# Patient Record
Sex: FEMALE | Race: BLACK OR AFRICAN AMERICAN | NOT HISPANIC OR LATINO | Employment: UNEMPLOYED | URBAN - METROPOLITAN AREA
[De-identification: names, ages, dates, MRNs, and addresses within clinical notes are randomized per-mention and may not be internally consistent; named-entity substitution may affect disease eponyms.]

---

## 2023-06-20 ENCOUNTER — PATIENT MESSAGE (OUTPATIENT)
Dept: RESEARCH | Facility: HOSPITAL | Age: 23
End: 2023-06-20

## 2023-09-27 ENCOUNTER — SOCIAL WORK (OUTPATIENT)
Dept: ADMINISTRATIVE | Facility: OTHER | Age: 23
End: 2023-09-27

## 2023-09-27 PROBLEM — Z59.89 DOES NOT HAVE HEALTH INSURANCE: Status: ACTIVE | Noted: 2023-09-27

## 2023-09-27 PROBLEM — Z20.2 POSSIBLE EXPOSURE TO STD: Status: ACTIVE | Noted: 2023-09-27

## 2023-09-27 NOTE — PROGRESS NOTES
SW received consult for pt in regards to needing to obtain insurance. SW placed a call to pt @ [phone], no answer and unable to leave a voice message due to voicemail box not being setup. SW called [phone], no answer, but SW left a voice message with reason for calling and call back #. SW awaiting return call. SW will re attempt to make contact with pt.     KENNY Andrade    [phone] (phone)  [phone] (fax)

## 2023-12-29 ENCOUNTER — SOCIAL WORK (OUTPATIENT)
Dept: ADMINISTRATIVE | Facility: OTHER | Age: 23
End: 2023-12-29

## 2023-12-29 NOTE — PROGRESS NOTES
SW met with pt regarding initial OB assessment. Pt stated this is her 1st pregnancy/0-miscarriage. Pt stated lives with her mother and able to perform ADL's independently. Pt stated does work. Pt stated support system is her boyfriend/[employer]. Pt stated has ([employer])insurance. Pt stated does not have WIC. Pt stated undecided about breastfeed. SW provide pt with information on other community resources. No other needs identified at this time.    KENNY Miller  Desk:[phone]  Fax:[phone]

## 2024-02-12 PROBLEM — O99.210 OBESITY IN PREGNANCY: Status: ACTIVE | Noted: 2024-02-12

## 2024-02-12 PROBLEM — O09.92 SUPERVISION OF HIGH RISK PREGNANCY IN SECOND TRIMESTER: Status: ACTIVE | Noted: 2024-02-12

## 2024-04-01 PROBLEM — O26.879 SHORT CERVIX AFFECTING PREGNANCY: Status: ACTIVE | Noted: 2024-04-01

## 2024-04-03 PROBLEM — O34.30: Status: ACTIVE | Noted: 2024-04-03
